# Patient Record
Sex: FEMALE | Race: OTHER | NOT HISPANIC OR LATINO | URBAN - METROPOLITAN AREA
[De-identification: names, ages, dates, MRNs, and addresses within clinical notes are randomized per-mention and may not be internally consistent; named-entity substitution may affect disease eponyms.]

---

## 2017-08-24 ENCOUNTER — IMPORTED ENCOUNTER (OUTPATIENT)
Dept: URBAN - METROPOLITAN AREA CLINIC 1 | Facility: CLINIC | Age: 42
End: 2017-08-24

## 2017-08-24 PROBLEM — B30.9: Noted: 2017-08-24

## 2017-08-24 PROCEDURE — 92002 INTRM OPH EXAM NEW PATIENT: CPT

## 2017-08-24 NOTE — PATIENT DISCUSSION
1.  Viral Conjunctivitis OU -- Advised no CTL wear. Cont Ofloxacin TID OU. Begin Pred Forte TID OU. Begin PF ATs Q1-2H OU. The condition was discussed with the patient. Cool compresses and artificial tears were recommended. The mechanism of transmission was explained and the patient was educated to wash hands and use separate towels and bedding. Return for an appointment in 1 week 10 (viral f/u) with Dr. Joni Schaumann.

## 2017-08-31 ENCOUNTER — IMPORTED ENCOUNTER (OUTPATIENT)
Dept: URBAN - METROPOLITAN AREA CLINIC 1 | Facility: CLINIC | Age: 42
End: 2017-08-31

## 2017-08-31 PROBLEM — B30.9: Noted: 2017-08-31

## 2017-08-31 PROCEDURE — 99213 OFFICE O/P EST LOW 20 MIN: CPT

## 2017-08-31 NOTE — PATIENT DISCUSSION
1.  Viral Conjunctivitis OU -- The condition was discussed with the patient. Cool compresses and PF artificial tears were recommended. The mechanism of transmission was explained and the patient was educated to wash hands and use separate towels and bedding. Patient can DC Ofloxacin and cont Pred Forte BID OU for a week then QD for a week then DC. **Patient can resume CL in a week   2. Return for an appointment in as needed with Dr. Katie Sofia.

## 2018-06-11 ENCOUNTER — IMPORTED ENCOUNTER (OUTPATIENT)
Dept: URBAN - METROPOLITAN AREA CLINIC 1 | Facility: CLINIC | Age: 43
End: 2018-06-11

## 2018-06-11 PROBLEM — H52.13: Noted: 2018-06-11

## 2018-06-11 PROCEDURE — S0621 ROUTINE OPHTHALMOLOGICAL EXA: HCPCS

## 2018-06-11 NOTE — PATIENT DISCUSSION
1. Myopia: Rx was given for correction if indicated and requested. 2. Dry Eyes w/ PEK OU - Recommend ATs TID-QID OU routinely Finalized CTL RXReturn for an appointment in 1 year 40/cc with Dr. Espinoza Pickard.

## 2018-06-11 NOTE — PATIENT DISCUSSION
Dry Eyes w/ PEK OU - Recommend ATs TID-QID OU routinely Finalized CTL RXReturn for an appointment in 1 year 40/cc with Dr. Som Rodríguez.

## 2018-08-29 ENCOUNTER — HOSPITAL ENCOUNTER (OUTPATIENT)
Dept: PHYSICAL THERAPY | Age: 43
Discharge: HOME | End: 2018-08-29
Payer: COMMERCIAL

## 2018-08-29 PROCEDURE — 97162 PT EVAL MOD COMPLEX 30 MIN: CPT

## 2018-08-29 PROCEDURE — 97140 MANUAL THERAPY 1/> REGIONS: CPT

## 2018-08-29 NOTE — PROGRESS NOTES
PHYSICAL THERAPY - DAILY TREATMENT NOTE Patient Name: Dennis Murry        Date: 2018 : 1975    Patient  Verified: Beth Parish Visit #:     Insurance: Payor: Ricardo Alejandro / Plan: [place] [employer]  CAPITATED PT / Product Type: Commerical / In time: 4:30 Out time: 5:45 Total Treatment Time: 75 Medicare Time Tracking (below) Total Timed Codes (min):  - 1:1 Treatment Time:  -  
 
TREATMENT AREA/ DIAGNOSIS = Pain in left lower leg [M79.662] SUBJECTIVE Pain Level (on 0 to 10 scale):  0  / 10 Medication Changes/New allergies or changes in medical history, any new surgeries or procedures? NO    If yes, update Summary List  
Subjective Functional Status/Changes:  []  No changes reported See Eval   
 
OBJECTIVE Modalities Rationale:     decrease pain to improve patient's ability to perform ADLs without pain 
 
 min [] Estim, type/location:    
                                 []  att     []  unatt     []  w/US     []  w/ice    []  w/heat  
 min []  Mechanical Traction: type/lbs   
               []  pro   []  sup   []  int   []  cont    []  before manual    []  after manual  
 min []  Ultrasound, settings/location:    
 min []  Iontophoresis w/ dexamethasone, location:   
                                           []  take home patch       []  in clinic  
10 min [x]  Ice     []  Heat    location/position:   
 min []  Vasopneumatic Device, press/temp:   
 min []  Other:   
[] Skin assessment post-treatment (if applicable):   
[]  intact    []  redness- no adverse reaction    
[]redness  adverse reaction:     
 
10  min Manual Therapy: STM to L gastroc/soleus/posterior tibialis Rationale:      increase tissue extensibility and decrease trigger points to improve patient's ability to perform ADLs without pain Throughout treatment session min Patient Education:  Yes    [x] Reviewed HEP []  Progressed/Changed HEP based on:      
 
Other Objective/Functional Measures: 
 
Se Eval  
 Post Treatment Pain Level (on 0 to 10) scale:   0  / 10 ASSESSMENT Assessment/Changes in Function:  
Pt to be fitted for orthotics to help with significant navicular drop 
  
[]  See Progress Note/Recertification Patient will continue to benefit from skilled PT services to modify and progress therapeutic interventions, address functional mobility deficits, address strength deficits, analyze and address soft tissue restrictions, analyze and cue movement patterns and analyze and modify body mechanics/ergonomics to attain remaining goals. Progress toward goals / Updated goals: 
See Eval  
 
PLAN [x]  Upgrade activities as tolerated YES Continue plan of care  
[]  Discharge due to :   
[]  Other:   
 
Therapist: Koki MckeonPT Date: 8/29/2018 Time: 6:22 PM  
 
  
Future Appointments Date Time Provider Elise Hall 9/5/2018 9:00  [address], PT REHAB [facility] AT [facility]  
9/6/2018 3:00  [address], PT [facility] AT [facility]  
9/10/2018 4:30 PM Iam Lu, PT REH[facility]ENTER AT [facility]  
9/12/2018 5:00 PM Rolanda Ip REH[facility] AT [facility]  
9/19/2018 5:00 PM Koki Ip REH[facility]  
9/24/2018 4:30 PM Sofy Hickman, PT [facility]  
9/26/2018 6:00 PM Rolanda Ip RE[facility]

## 2018-08-29 NOTE — PROGRESS NOTES
Vanessa Dowd [facility] [place] PHYSICAL THERAPY AT [address] [facility], [address] [place] [address], [address] [place] [address], [address] [place] [address] - Phone: [phone]  Fax: [phone] PLAN OF CARE / STATEMENT OF MEDICAL NECESSITY FOR PHYSICAL THERAPY SERVICES Patient Name: Shaylee Luna : 1975 Treatment Diagnosis: Left lower leg and ankle pain Medical Diagnosis: Pain in left lower leg [M79.662] Onset Date: 3 months ago Referral Source: Ressie Salts, * Start of Care [facility]): 2018 Prior Hospitalization: See medical history Provider #: [id number] Prior Level of Function: Pain free with all ADLs Comorbidities: Pt reports depression, asthma, and scoliosis Medications: Verified on Patient Summary List  
The Plan of Care and following information is based on the information from the initial evaluation.  
=========================================================================================== Assessment / key information:  Pt is a 43 yo female that presents to physical therapy with a chief complaint of pain in her calf and swelling in the lower leg. Pt history revealed that she torn her calf muscle in 2017 when she was running backwards on the sand at [employer]. Pt underwent PT for the tear and states that it got better for a while. Pt reports that after she stopped doing stuff for her ankle about three months later she began to get swelling in her leg when she would sit for extended periods of time and would have occasional ankle pain. She presents with pain ranging from 0-10/10, located on the medial aspect of the calf and sometimes in the lateral ankle. X-rays were negative for fractures and ultrasound was negative for DVT. Pain is made worse with sitting and standing for prolonged periods, better with elevating the leg. Palpation reveals TTP to L tibialis posterior. Gait: Pt walks with increased pronation of the L ankle.   Ankle AROM/PROM: DF WNL, PF WNL, Inv WNL,  EV WNL,LE MMT: hip flex 4+/5, abd 4/5, add 5/5, ext 5/5, knee flex 5/5, ext 5/5/, ankle DF 5/5, PF 5/5, Inv4/5, Ever 4/5. Sensation is intact to light touch in the LE. . (-) Special tests include: Leon test and pascual sign. Edema is as follows: Malleoli R: 23.25cm L: 23.5cm, Mid Calf 5in inferior to patella: R: 39.5cm  L: 40.5. Navicular drop R: 1.4cm L:2.2cm  FOTO Score: 73.  Pt will benefit from PT interventions to address the aforementioned deficits and allow pt to return to PLOF. Eval Complexity: History MEDIUM  Complexity : 1-2 comorbidities / personal factors will impact the outcome/ POC ;  Examination  MEDIUM Complexity : 3 Standardized tests and measures addressing body structure, function, activity limitation and / or participation in recreation ; Presentation MEDIUM Complexity : Evolving with changing characteristics ; Decision Making MEDIUM Complexity : FOTO score of 26-74; Overall Complexity MEDIUM 
=========================================================================================== Problem List: pain affecting function, decrease strength, edema affecting function, impaired gait/ balance, decrease ADL/ functional abilitiies and decrease activity tolerance Treatment Plan may include any combination of the following: Therapeutic exercise, Therapeutic activities, Neuromuscular re-education, Physical agent/modality, Gait/balance training, Manual therapy, Patient education and Self Care training Patient / Family readiness to learn indicated by: asking questions, trying to perform skills and interestPersons(s) to be included in education: patient (P) Barriers to Learning/Limitations: no 
Measures taken:   
Patient Goal (s): Pt wants to get stronger and decrease pain and swelling Patient self reported health status: good Rehabilitation Potential: good ? Short Term Goals: To be accomplished in  2  weeks: 1.  Pt will be independent and compliant with HEP to decrease pain, increase ROM and return pt to PLOF. 2.Pt will note a +3 on the GROC to show improvements ? Long Term Goals: To be accomplished in  6  weeks: 
1. Increase score on FOTO by > or = to 90 points to demo an increase in functional activity tolerance with the LE. 2. Pt will note < or = 1/10 pain with all mobility to improve comfort with ADLs. 3. Pt will have LE strength of 5/5 to improve her ability to perform ADLs 4. Pt will note a +6 score on the GROC to show improvement Frequency / Duration:   Patient to be seen  2  times per week for 6  weeks: 
Patient / Caregiver education and instruction: self care, activity modification and exercises G-Codes (GP): - Therapist Signature: Omid Calabrese PT Date: 8/29/2018 Certification Period: - Time: 6:06 PM  
=========================================================================================== I certify that the above Physical Therapy Services are being furnished while the patient is under my care. I agree with the treatment plan and certify that this therapy is necessary. Physician Signature:       Date:      Time:  Please sign and return to [facility] or you may fax the signed copy to [phone]. Thank you.

## 2018-09-05 ENCOUNTER — HOSPITAL ENCOUNTER (OUTPATIENT)
Dept: PHYSICAL THERAPY | Age: 43
Discharge: HOME | End: 2018-09-05
Payer: COMMERCIAL

## 2018-09-05 PROCEDURE — 97140 MANUAL THERAPY 1/> REGIONS: CPT

## 2018-09-05 PROCEDURE — 97110 THERAPEUTIC EXERCISES: CPT

## 2018-09-05 NOTE — PROGRESS NOTES
PHYSICAL THERAPY - DAILY TREATMENT NOTE Patient Name: Gerardo Perdomo        Date: 2018 : 1975   YES Patient  Verified Visit #:      12  Insurance: Payor: Cyndie Dela Cruz / Plan: [employer]  CAPITATED PT / Product Type: Commerical / In time: 900 Out time: 1005 Total Treatment Time: 72 Medicare Time Tracking (below) Total Timed Codes (min):   1:1 Treatment Time:    
 
TREATMENT AREA =  Pain in left lower leg [M79.662] SUBJECTIVE Pain Level (on 0 to 10 scale):  0  / 10 Medication Changes/New allergies or changes in medical history, any new surgeries or procedures? NO    If yes, update Summary List  
Subjective Functional Status/Changes:  []  No changes reported \"I had no swelling for a few days but then yesterday it swelled after going to work. \" OBJECTIVE Modalities Rationale:     decrease inflammation and decrease pain to improve patient's ability to tolerate ADL's 
 min [] Estim, type/location:    
                                 []  att     []  unatt     []  w/US     []  w/ice    []  w/heat  
 min []  Mechanical Traction: type/lbs   
               []  pro   []  sup   []  int   []  cont    []  before manual    []  after manual  
 min []  Ultrasound, settings/location:    
 min []  Iontophoresis w/ dexamethasone, location:   
                                           []  take home patch       []  in clinic  
10 min [x]  Ice     []  Heat    location/position: Supine to Left LE  
 min []  Vasopneumatic Device, press/temp:   
 min []  Other:   
[x] Skin assessment post-treatment (if applicable):   
[x]  intact    [x]  redness- no adverse reaction    
[]redness  adverse reaction:     
 
30 min Therapeutic Exercise:  [x]  See flow sheet Rationale:      increase ROM and increase strength to improve the patients ability to perform ADL's  
 
25 min Manual Therapy: TrPR to left post tib, STM Rationale:      decrease pain and increase tissue extensibility to improve patient's ability to perform ADL's 
 
 
 min Patient Education:  YES  Reviewed HEP []  Progressed/Changed HEP based on: Other Objective/Functional Measures: 
 
Progressed therex per flow sheet Post Treatment Pain Level (on 0 to 10) scale:   0  / 10 ASSESSMENT Assessment/Changes in Function:  
 
Difficulty with BAPS and LE control. Continued soft tissue restrictions in left post tib 
  
[]  See Progress Note/Recertification Patient will continue to benefit from skilled PT services to modify and progress therapeutic interventions, address functional mobility deficits, address ROM deficits, address strength deficits, analyze and address soft tissue restrictions, analyze and cue movement patterns, analyze and modify body mechanics/ergonomics and assess and modify postural abnormalities to attain remaining goals. Progress toward goals / Updated goals: 
 
Patient is progressing towards newly established goals PLAN [x]  Upgrade activities as tolerated YES Continue plan of care  
[]  Discharge due to :   
[]  Other:   
 
Therapist: Becky Costa PT Date: 9/5/2018 Time: 8:21 AM  
 
Future Appointments Date Time Provider Elise Hall 9/5/2018 9:00 AM Becky Costa PT REHAB CENTER AT [facility]  
9/6/2018 3:00  [address], PT REHAB CENTER AT [facility]  
9/10/2018 4:30 PM Iam Lu PT REHAB CENTER AT [facility]  
9/12/2018 5:00 PM Caleb Zamora REH[facility]ENTER AT [facility]  
9/19/2018 5:00 PM Caleb Zamora RE[facility] AT [facility]  
9/24/2018 4:30 PM Sofy Lott PT REHAB CENTER AT [facility]  
9/26/2018 6:00 PM Caleb Zamora REH[facility] AT [facility]

## 2018-09-06 ENCOUNTER — HOSPITAL ENCOUNTER (OUTPATIENT)
Dept: PHYSICAL THERAPY | Age: 43
Discharge: HOME | End: 2018-09-06
Payer: COMMERCIAL

## 2018-09-06 PROCEDURE — 97763 ORTHC/PROSTC MGMT SBSQ ENC: CPT

## 2018-09-06 NOTE — PROGRESS NOTES
PHYSICAL THERAPY - DAILY TREATMENT NOTE Patient Name: Jeanna Plasencia        Date: 2018 : 1975   YES Patient  Verified Visit #:   3  of   12  Insurance: Payor: Antony Layton / Plan: [place] OPTIM  CAPITATED PT / Product Type: Commerical / In time: 900 Out time: 1005 Total Treatment Time: 72 Medicare Time Tracking (below) Total Timed Codes (min):   1:1 Treatment Time:    
TREATMENT AREA =  Pain in left lower leg [M79.662] SUBJECTIVE Pain Level (on 0 to 10 scale):  0  / 10 Medication Changes/New allergies or changes in medical history, any new surgeries or procedures? NO    If yes, update Summary List  
Subjective Functional Status/Changes:  []  No changes reported Using Birkenstocks, sandals with straps for summer and sneakers for support. Shoe size 8.5. Over the counter  not helpful. OBJECTIVE 45 min Orthotic Fitting:  [x]  See flow sheet Rationale:      increase ROM and increase strength to improve the patients ability to perform ADL's  
 min Patient Education:  Mary Ahumada []  Progressed/Changed HEP based on: Other Objective/Functional Measures: 
 
 
Ambulation shows moderatel medial long arch collapse with gait, and mod cacl EV. No forefoot abduction with stance noted. LE strength is reduced for right hip abd, markedly reduced left hip abd. Midfoot and first ray mobility hypermobile. Subtalar neutral assessment: varus lower leg bilat; varus forefoot to rearfoot bilat L>R. Post Treatment Pain Level (on 0 to 10) scale:   0  / 10 ASSESSMENT Assessment/Changes in Function: Pt with improved symptoms with [employer] orthotics B, L > R posting for RF and FF posting. []  See Progress Note/Recertification Patient will continue to benefit from skilled PT services to modify and progress therapeutic interventions, address functional mobility deficits, address ROM deficits, address strength deficits, analyze and address soft tissue restrictions, analyze and cue movement patterns, analyze and modify body mechanics/ergonomics and assess and modify postural abnormalities to attain remaining goals. Progress toward goals / Updated goals: 
 
1. Independent with HEP. 2. Will f/u for orthotic pickup with good initial fit and will return for adjustments prn.  
3. Will acquire stability shoes to assist with pronation/IR of LE with stance. PLAN [x]  Upgrade activities as tolerated YES Continue plan of care  
[]  Discharge due to :   
[]  Other:   
 
Therapist: Ok Vargas PT Date: 9/6/2018 Time: 3:15 pm  
 
Future Appointments Date Time Provider Elise Hall 9/10/2018 4:30 PM Ok Vargas PT REHAB CENTER AT [facility]  
9/12/2018 5:00 PM [facility] AT [facility]  
9/19/2018 5:00 PM [facility]  
9/21/2018 3:00 PM [facility]  
9/24/2018 4:30 PM Sofy Esparza, [facility]  
9/26/2018 6:00 PM [facility] AT [facility]

## 2018-09-10 ENCOUNTER — HOSPITAL ENCOUNTER (OUTPATIENT)
Dept: PHYSICAL THERAPY | Age: 43
Discharge: HOME | End: 2018-09-10
Payer: COMMERCIAL

## 2018-09-10 PROCEDURE — 97140 MANUAL THERAPY 1/> REGIONS: CPT

## 2018-09-10 PROCEDURE — 97110 THERAPEUTIC EXERCISES: CPT

## 2018-09-10 NOTE — PROGRESS NOTES
PHYSICAL THERAPY - DAILY TREATMENT NOTE Patient Name: iMna Wagner        Date: 9/10/2018 : 1975   YES Patient  Verified Visit #:      12  Insurance: Payor: Madiha Erickson / Plan: [employer]  CAPITATED PT / Product Type: Commerical / In time: 435 Out time: 515 Total Treatment Time: 40 Medicare Time Tracking (below) Total Timed Codes (min):   1:1 Treatment Time:    
 
TREATMENT AREA = Pain in left lower leg [M79.662] SUBJECTIVE Pain Level (on 0 to 10 scale):  0  / 10 Medication Changes/New allergies or changes in medical history, any new surgeries or procedures? NO    If yes, update Summary List  
Subjective Functional Status/Changes:  []  No changes reported Some R knee pain yesterday after standing a period of time--maybe due to new orthotics. Will  monitor OBJECTIVE 30 min Therapeutic Exercise:  [x]  See flow sheet Rationale:      increase ROM and increase strength to improve the patients ability to amb/ stand / sit prolonged with less pain 10 min Manual Therapy: Technique:     
[x] STM[]IASTM[x]TPR[]PROM[] Stretching 
[] SOR[] man traction[] []OP with REIL 
[]Jt manipulation []Gr I [] II []  III [] IV[] V[] Treatment Area:  L post/ med calf region Rationale:      decrease pain, increase ROM, increase tissue extensibility and decrease trigger points to improve patient's ability to amb/ stand / sit prolonged period 
 
 
 
 
 
throughout Rx min Patient Education:  Mary Reardon []  Progressed/Changed HEP based on: Other Objective/Functional Measures: 
 
Mild edema popliteal region L Post Treatment Pain Level (on 0 to 10) scale:   0  / 10 ASSESSMENT Assessment/Changes in Function:  
 
Functional improvement:  Reports wearing orthotics with benefit Functional limitation:  Mild edema  
  
[]  See Progress Note/Recertification Patient will continue to benefit from skilled PT services to modify and progress therapeutic interventions, address functional mobility deficits, address ROM deficits, address strength deficits, analyze and address soft tissue restrictions and analyze and cue movement patterns to attain remaining goals. Progress toward goals / Updated goals: 
 
Progressed HEP  
 
PLAN [x]  Upgrade activities as tolerated YES Continue plan of care  
[]  Discharge due to :   
[]  Other:   
 
Therapist: Feliciano Gifford PT Date: 9/10/2018 Time: 4:38 PM  
 
Future Appointments Date Time Provider Elise Hall 9/12/2018 5:00 PM [facility]  
9/19/2018 5:00 PM [facility]  
9/21/2018 3:00 PM [facility]  
9/24/2018 4:30 PM Sofy Do, [facility]  
9/26/2018 6:00 PM [facility]  
10/16/2018 9:30 AM MD Marcos Boothe

## 2018-09-12 ENCOUNTER — APPOINTMENT (OUTPATIENT)
Dept: PHYSICAL THERAPY | Age: 43
End: 2018-09-12
Payer: COMMERCIAL

## 2018-09-19 ENCOUNTER — HOSPITAL ENCOUNTER (OUTPATIENT)
Dept: PHYSICAL THERAPY | Age: 43
Discharge: HOME | End: 2018-09-19
Payer: COMMERCIAL

## 2018-09-19 PROCEDURE — 97110 THERAPEUTIC EXERCISES: CPT

## 2018-09-19 PROCEDURE — 97140 MANUAL THERAPY 1/> REGIONS: CPT

## 2018-09-19 NOTE — PROGRESS NOTES
PHYSICAL THERAPY - DAILY TREATMENT NOTE Patient Name: Mark Faith        Date: 2018 : 1975    Patient  Verified: Vince Reardon Visit #:     Insurance: Payor: Neftaly Faith / Plan: [facility]  CAPITATED PT / Product Type: Commerical / In time: 5:00 Out time: 6:10 Total Treatment Time: 70 Medicare Time Tracking (below) Total Timed Codes (min):  - 1:1 Treatment Time:  -  
 
TREATMENT AREA/ DIAGNOSIS = Pain in left lower leg [M79.662] SUBJECTIVE Pain Level (on 0 to 10 scale):  0  / 10 Medication Changes/New allergies or changes in medical history, any new surgeries or procedures? NO    If yes, update Summary List  
Subjective Functional Status/Changes:  []  No changes reported Pt said her feet have been sore but she has been wearing the orthotic continuously for 8 hours at work since she got them. OBJECTIVE Modalities Rationale:     decrease pain to improve patient's ability to perform ADLs without pain 
 
 min [] Estim, type/location:    
                                 []  att     []  unatt     []  w/US     []  w/ice    []  w/heat  
 min []  Mechanical Traction: type/lbs   
               []  pro   []  sup   []  int   []  cont    []  before manual    []  after manual  
 min []  Ultrasound, settings/location:    
 min []  Iontophoresis w/ dexamethasone, location:   
                                           []  take home patch       []  in clinic  
10 min [x]  Ice     []  Heat    location/position: Medial calf  
 min []  Vasopneumatic Device, press/temp:   
 min []  Other:   
[] Skin assessment post-treatment (if applicable):   
[]  intact    []  redness- no adverse reaction    
[]redness  adverse reaction:     
 
15 min Manual Therapy: STM to L gastroc/soleus/ tib posterior Rationale:      increase ROM, increase tissue extensibility and decrease trigger points to improve patient's ability to perform ADLs without pain 45 min Therapeutic Exercise:  [x]  See flow sheet Rationale:      increase strength, improve coordination and improve balance to improve the patients ability to perform ADLs without pain Throughout treatment session min Patient Education:  Yes    [x] Reviewed HEP []  Progressed/Changed HEP based on: Other Objective/Functional Measures: 
 
Pt had no increase in pain during treatment session Pt had increase TTP on the tibialis posterior of the left leg Initiated new closed kinetic chain exercises (see flowsheet) Post Treatment Pain Level (on 0 to 10) scale:   0  / 10 ASSESSMENT Assessment/Changes in Function: Pt benefited from manual therapy based on improved TTP post treatment. []  See Progress Note/Recertification Patient will continue to benefit from skilled PT services to modify and progress therapeutic interventions, address functional mobility deficits, address ROM deficits, address strength deficits, analyze and address soft tissue restrictions and analyze and cue movement patterns to attain remaining goals. Progress toward goals / Updated goals: 
 
Continue with current treatment plan PLAN [x]  Upgrade activities as tolerated YES Continue plan of care  
[]  Discharge due to :   
[]  Other:   
 
Therapist: Melyssa CruzPT Date: 9/19/2018 Time: 5:01 PM  
 
  
Future Appointments Date Time Provider Elise Hall 9/21/2018 3:00 PM Melyssa Cruz REH[facility]ENTER AT [facility]  
9/24/2018 4:30 PM Sofy Akhtar, PT REHAB CENTER AT [facility]  
9/26/2018 6:00 PM Melyssa Cruz REH[facility]ENTER AT [facility]  
10/16/2018 9:30 AM MD Marcos Cabello

## 2018-09-21 ENCOUNTER — HOSPITAL ENCOUNTER (OUTPATIENT)
Dept: PHYSICAL THERAPY | Age: 43
End: 2018-09-21
Payer: COMMERCIAL

## 2018-09-24 ENCOUNTER — APPOINTMENT (OUTPATIENT)
Dept: PHYSICAL THERAPY | Age: 43
End: 2018-09-24
Payer: COMMERCIAL

## 2018-09-26 ENCOUNTER — APPOINTMENT (OUTPATIENT)
Dept: PHYSICAL THERAPY | Age: 43
End: 2018-09-26
Payer: COMMERCIAL

## 2018-10-01 ENCOUNTER — APPOINTMENT (OUTPATIENT)
Dept: PHYSICAL THERAPY | Age: 43
End: 2018-10-01
Payer: COMMERCIAL

## 2018-10-03 ENCOUNTER — HOSPITAL ENCOUNTER (OUTPATIENT)
Dept: PHYSICAL THERAPY | Age: 43
Discharge: HOME | End: 2018-10-03
Payer: COMMERCIAL

## 2018-10-03 PROCEDURE — 97140 MANUAL THERAPY 1/> REGIONS: CPT

## 2018-10-03 PROCEDURE — 97110 THERAPEUTIC EXERCISES: CPT

## 2018-10-03 NOTE — PROGRESS NOTES
PHYSICAL THERAPY - DAILY TREATMENT NOTE Patient Name: Michael Zee        Date: 10/3/2018 : 1975    Patient  Verified: Hector Durand Visit #:     Insurance: Payor: Jeanine Souza / Plan: [facility]  CAPITATED PT / Product Type: Commerical / In time: 5:35 Out time: 6:35 Total Treatment Time: 60 Medicare Time Tracking (below) Total Timed Codes (min):  - 1:1 Treatment Time:  -  
TREATMENT AREA/ DIAGNOSIS = Pain in left lower leg [M79.662] SUBJECTIVE Pain Level (on 0 to 10 scale):  1  / 10 Medication Changes/New allergies or changes in medical history, any new surgeries or procedures? NO    If yes, update Summary List  
Subjective Functional Status/Changes:  []  No changes reported Pt reports that she is  on the left calf. Pt reports its a little sore today from walking around in flip flops yesterday [id number] min Manual Therapy: STM to L gastroc Rationale:      increase tissue extensibility and decrease trigger points to improve patient's ability to perform ADLs without pain 50 min Therapeutic Exercise:  [x]  See flow sheet Rationale:      increase strength, improve coordination and improve balance to improve the patients ability to perform ADLs without pain Throughout treatment session min Patient Education:  Yes    [x] Reviewed HEP []  Progressed/Changed HEP based on: Other Objective/Functional Measures: 
 
Pt had no increase in pain during treatment session Post Treatment Pain Level (on 0 to 10) scale:   0  / 10 ASSESSMENT Assessment/Changes in Function:  
 
Pt had decreased TTP post treatment. Pt advised to perform HEP more often to help her progress faster. []  See Progress Note/Recertification Patient will continue to benefit from skilled PT services to modify and progress therapeutic interventions, address functional mobility deficits, address ROM deficits, address strength deficits, analyze and address soft tissue restrictions and analyze and cue movement patterns to attain remaining goals. Progress toward goals / Updated goals: 
Pt improving with all weightbearing activities. PLAN [x]  Upgrade activities as tolerated YES Continue plan of care  
[]  Discharge due to :   
[]  Other:   
 
Therapist: Katherine Corbett ,PT Date: 10/3/2018 Time: 5:46 PM  
 
  
Future Appointments Date Time Provider Elise Hall 10/11/2018 4:30 PM Sofy Sutton, PT [facility]  
10/16/2018 9:30 AM MD Marcos Ortiz

## 2018-10-11 ENCOUNTER — HOSPITAL ENCOUNTER (OUTPATIENT)
Dept: PHYSICAL THERAPY | Age: 43
Discharge: HOME | End: 2018-10-11
Payer: COMMERCIAL

## 2018-10-11 PROCEDURE — 97140 MANUAL THERAPY 1/> REGIONS: CPT

## 2018-10-11 PROCEDURE — 97110 THERAPEUTIC EXERCISES: CPT

## 2018-10-11 NOTE — PROGRESS NOTES
PHYSICAL THERAPY - DAILY TREATMENT NOTE Patient Name: Marika Ye        Date: 10/11/2018 : 1975    Patient  Verified: Harsha Gay Visit #:     Insurance: Payor: Sharmaine Fee / Plan: [facility] [employer]  CAPITATED PT / Product Type: Commerical / In time: 445 Out time: 515 Total Treatment Time: 30 Medicare Time Tracking (below) Total Timed Codes (min):  - 1:1 Treatment Time:  -  
TREATMENT AREA/ DIAGNOSIS = Pain in left lower leg [M79.662] SUBJECTIVE Pain Level (on 0 to 10 scale):  1  / 10 Medication Changes/New allergies or changes in medical history, any new surgeries or procedures? NO    If yes, update Summary List  
Subjective Functional Status/Changes:  []  No changes reported Exercises are really helping, not getting as much cramping. Pt reports that she is trying to wear orthotic and \"good\" leg is feeling bad. [id number] min Manual Therapy: STM to L gastroc Rationale:      increase tissue extensibility and decrease trigger points to improve patient's ability to perform ADLs without pain 20 min Therapeutic Exercise:  [x]  See flow sheet Rationale:      increase strength, improve coordination and improve balance to improve the patients ability to perform ADLs without pain T/o treatment session min Patient Education:  Yes    [x] Reviewed HEP []  Progressed/Changed HEP based on: Other Objective/Functional Measures: 
 
Pt orthotic adjusted for R sided midfoot support. Review of HEP after manual tx. Post Treatment Pain Level (on 0 to 10) scale:   0  / 10 ASSESSMENT Assessment/Changes in Function: Pt with I with current HEP. []  See Progress Note/Recertification Patient will continue to benefit from skilled PT services to modify and progress therapeutic interventions, address functional mobility deficits, address ROM deficits, address strength deficits, analyze and address soft tissue restrictions and analyze and cue movement patterns to attain remaining goals. Progress toward goals / Updated goals: Pt with good progress to goals--reassess at next visit. PLAN [x]  Upgrade activities as tolerated YES Continue plan of care  
[]  Discharge due to :   
[]  Other:   
 
Therapist: Rob Aragon PT ,PT Date: 10/11/2018 Time: 5:02 PM  
 
  
Future Appointments Date Time Provider Elise Hall 10/16/2018 9:30 AM Shirley Traore MD [facility]

## 2018-10-18 ENCOUNTER — HOSPITAL ENCOUNTER (OUTPATIENT)
Dept: PHYSICAL THERAPY | Age: 43
Discharge: HOME | End: 2018-10-18
Payer: COMMERCIAL

## 2018-10-18 PROCEDURE — 97110 THERAPEUTIC EXERCISES: CPT

## 2018-10-18 NOTE — PROGRESS NOTES
PHYSICAL THERAPY - DAILY TREATMENT NOTE Patient Name: Love Sierra        Date: 10/18/2018 : 1975    Patient  Verified: Kiana Honeycutt Visit #:     Insurance: Payor: Ladd Crigler / Plan: [facility]  CAPITATED PT / Product Type: Commerical / In time: 415 Out time: 5 Total Treatment Time: 45 Medicare Time Tracking (below) Total Timed Codes (min):  - 1:1 Treatment Time:  -  
TREATMENT AREA/ DIAGNOSIS = Pain in left lower leg [M79.662] SUBJECTIVE Pain Level (on 0 to 10 scale):  1  / 10 Medication Changes/New allergies or changes in medical history, any new surgeries or procedures? NO    If yes, update Summary List  
Subjective Functional Status/Changes:  []  No changes reported Doing much better. Less pain from B LE, knee and ankle. Noting good response to orthotic. OBJECTIVE 5 min Manual Therapy: STM to L gastroc Rationale:      increase tissue extensibility and decrease trigger points to improve patient's ability to perform ADLs without pain 4035 min Therapeutic Exercise:  [x]  See flow sheet Rationale:      increase strength, improve coordination and improve balance to improve the patients ability to perform ADLs without pain T/o treatment session min Patient Education:  Yes    [x] Reviewed HEP []  Progressed/Changed HEP based on: Other Objective/Functional Measures: 
 
Pt updated HEP issued. Post Treatment Pain Level (on 0 to 10) scale:   0  / 10 ASSESSMENT Assessment/Changes in Function:  
See PN. 
  
[]  See Progress Note/Recertification Patient will continue to benefit from skilled PT services to modify and progress therapeutic interventions, address functional mobility deficits, address ROM deficits, address strength deficits, analyze and address soft tissue restrictions and analyze and cue movement patterns to attain remaining goals. See PN. PLAN [x]  Upgrade activities as tolerated YES Continue plan of care  
[]  Discharge due to :   
 []  Other:   
 
Therapist: Alfred Botello PT ,PT Date: 10/18/2018 Time: 5:12 PM  
 
  
No future appointments.

## 2018-11-01 ENCOUNTER — APPOINTMENT (OUTPATIENT)
Dept: PHYSICAL THERAPY | Age: 43
End: 2018-11-01
Payer: COMMERCIAL

## 2018-11-08 ENCOUNTER — HOSPITAL ENCOUNTER (OUTPATIENT)
Dept: PHYSICAL THERAPY | Age: 43
Discharge: HOME | End: 2018-11-08
Payer: COMMERCIAL

## 2018-11-08 PROCEDURE — 97110 THERAPEUTIC EXERCISES: CPT

## 2018-11-09 NOTE — PROGRESS NOTES
PHYSICAL THERAPY - DAILY TREATMENT NOTE Patient Name: Naldo Wylie        Date: 2018 : 1975    Patient  Verified: Precious Muñoz Visit #:   [facility]: [facility] / [facility]: [facility] [employer]  CAPITATED PT / Product Type: Commerical / In time: 415 Out time: 5 Total Treatment Time: 45 Medicare Time Tracking (below) Total Timed Codes (min):  - 1:1 Treatment Time:  -  
TREATMENT AREA/ DIAGNOSIS = Pain in joint, lower leg [M25.569] SUBJECTIVE Pain Level (on 0 to 10 scale):  1  / 10 Medication Changes/New allergies or changes in medical history, any new surgeries or procedures? NO    If yes, update Summary List  
Subjective Functional Status/Changes:  []  No changes reported Doing much better with orthotic and has been doing her HEP. She notes significant improvement in response to orthotic. OBJECTIVE5 min Manual Therapy: STM to L gastroc Rationale:      increase tissue extensibility and decrease trigger points to improve patient's ability to perform ADLs without pain 40 min Therapeutic Exercise:  [x]  See flow sheet Rationale:      increase strength, improve coordination and improve balance to improve the patients ability to perform ADLs without pain T/o treatment session min Patient Education:  Yes    [x] Reviewed HEP []  Progressed/Changed HEP based on: Other Objective/Functional Measures: 
 
Pt updated HEP discussed and formalized with pt D/C prep completed. Post Treatment Pain Level (on 0 to 10) scale:   0  / 10 ASSESSMENT Assessment/Changes in Function:  
See D/C. []  See Progress Note/Recertification Patient will continue to benefit from skilled PT services to modify and progress therapeutic interventions, address functional mobility deficits, address ROM deficits, address strength deficits, analyze and address soft tissue restrictions and analyze and cue movement patterns to attain remaining goals. All goals met, see D/C. PLAN 
 [x]  Upgrade activities as tolerated YES Continue plan of care  
[]  Discharge due to :   
[]  Other:   
 
Therapist: Marin Walsh PT ,PT Date: 11/9/2018 Time: 5:18 PM  
 
  
No future appointments.

## 2018-11-09 NOTE — PROGRESS NOTES
Vanessa Aldridgekikayden Dowd [address] [facility] PHYSICAL THERAPY AT [address] [facility], [address] [place] [address], [address] [place] [address], [address] [place] [address]  Phone: [phone]  Fax: [phone] DISCHARGE SUMMARY Patient Name: Editha Oppenheim : 1975 Treatment/Medical Diagnosis: Pain in joint, lower leg [M25.569] Referral Source: Conner Moyer, * Date of Initial Visit: 10/18/18 Attended Visits: 8 Missed Visits: 0  
 
SUMMARY OF TREATMENT Editha Oppenheim has been seen at our clinic 2-3x/wk for a total of 8 visits. Pt treatment has consisted of aerobic warm-up with stationary bike, therapeutic exercise for ankle ROM, ankle stability, and manual therapy. CURRENT STATUS Pt has had a good response to physical therapy treatment. She reports improved ability to ambulate with less pain and good response to updated orthotic. She is noting improvements in ROM provided she performs HEP for stretching and strengthening. Previous Goals: 
1. Increase score on FOTO by > or = to 90 points to demo an increase in functional activity tolerance with the LE. 2. Pt will note < or = 1/10 pain with all mobility to improve comfort with ADLs. 3. Pt will have LE strength of 5/5 to improve her ability to perform ADLs 4. Pt will note a +6 score on the GROC to show improvement Prior Level/Current Level: 
1) Prior Level: 73 
 Current Level: compliant Goal Met? 84 
2) Prior Level: 5 Current Level: 1 Goal Met? Yes 
3) Prior Level: 4/5 Current Level: 5/5 Goal Met? yes 4) Prior Level: +3 
 Current Level: +6 
 Goal Met? yes RECOMMENDATIONS Pt has made good progress toward LTGs and will be D/C to HEP at this time. If you have any questions/comments please contact us directly at [phone]. Thank you for allowing us to assist in the care of your patient. Therapist Signature: Tiffanie Theodore PT Date: 2018 Reporting Period: 
 -18 Time: 5:13 PM

## 2019-01-28 ENCOUNTER — IMPORTED ENCOUNTER (OUTPATIENT)
Dept: URBAN - METROPOLITAN AREA CLINIC 1 | Facility: CLINIC | Age: 44
End: 2019-01-28

## 2019-02-19 ENCOUNTER — IMPORTED ENCOUNTER (OUTPATIENT)
Dept: URBAN - METROPOLITAN AREA CLINIC 1 | Facility: CLINIC | Age: 44
End: 2019-02-19

## 2019-02-19 PROBLEM — H43.813: Noted: 2019-02-19

## 2019-02-19 PROBLEM — H04.123: Noted: 2019-02-19

## 2019-02-19 PROBLEM — H16.143: Noted: 2019-02-19

## 2019-02-19 PROCEDURE — 92015 DETERMINE REFRACTIVE STATE: CPT

## 2019-02-19 PROCEDURE — 92014 COMPRE OPH EXAM EST PT 1/>: CPT

## 2019-02-19 NOTE — PATIENT DISCUSSION
1. HENRI w/ PEK OU- Use ATs TID OU Routinely. 2. H/o CL Wear - Could try either computer rx or using OTC Readers over top of her CLs PRN (Can use 0.500.75 or 1.00 readers) All conditions discussed with patient patient understood. Return for an appointment in 1 year 27 with Dr. Charlotte Andres.

## 2020-01-13 ENCOUNTER — IMPORTED ENCOUNTER (OUTPATIENT)
Dept: URBAN - METROPOLITAN AREA CLINIC 1 | Facility: CLINIC | Age: 45
End: 2020-01-13

## 2020-01-13 PROBLEM — H44.23: Noted: 2020-01-13

## 2020-01-13 PROBLEM — H52.4: Noted: 2020-01-13

## 2020-01-13 PROCEDURE — 92015 DETERMINE REFRACTIVE STATE: CPT

## 2020-01-13 PROCEDURE — 92014 COMPRE OPH EXAM EST PT 1/>: CPT

## 2020-01-13 NOTE — PATIENT DISCUSSION
1. Myopia: Rx was given for correction if indicated and requested. 2. Presbyopia3. HENRI w/ PEK OU- ATs TID OU Routinely. CTL trials given; Discussed with patient best corrected vision will be achieved with glasses. Discussed with patient improving distance visual acuity may negatively affect near visual acuity and vice versa. Patient understands may need to wear OTC readers to read fine print. Return for an appointment in 1 week cc with Dr. Marcy Fong.

## 2020-01-17 ENCOUNTER — IMPORTED ENCOUNTER (OUTPATIENT)
Dept: URBAN - METROPOLITAN AREA CLINIC 1 | Facility: CLINIC | Age: 45
End: 2020-01-17

## 2020-01-17 NOTE — PATIENT DISCUSSION
CC today. Good comfort fit. Patient c/o of dva. Changes made OU and new CTL trials given. Return for an appointment in 1 week for a CC with Dr. Fabio Nelson.

## 2020-01-22 ENCOUNTER — IMPORTED ENCOUNTER (OUTPATIENT)
Dept: URBAN - METROPOLITAN AREA CLINIC 1 | Facility: CLINIC | Age: 45
End: 2020-01-22

## 2020-01-22 NOTE — PATIENT DISCUSSION
CC today- Good comfort. Good nva. Patient c/o of dva. Changes made OU and new CTL trials ordered. Ok to final CTL if these improve her dva. Return for an appointment in 1 year 40/cc with Dr. Fabio Nelson.

## 2020-02-12 ENCOUNTER — IMPORTED ENCOUNTER (OUTPATIENT)
Dept: URBAN - METROPOLITAN AREA CLINIC 1 | Facility: CLINIC | Age: 45
End: 2020-02-12

## 2020-02-12 NOTE — PATIENT DISCUSSION
CC today. Good comfort fit and vision. Finalized CTL Rx today. Return for an appointment in 1 year for a 40/cc with Dr. Milan Madrigal.

## 2021-02-26 NOTE — PATIENT DISCUSSION
Discussed option of iStent implantation at the time of cataract surgery with the goal of lower IOP or fewer medications. Indications, risks, benefits and alternatives to iStent implantation discussed with patient. Handout given. Questions answered. Patient elects to proceed.

## 2021-02-26 NOTE — PATIENT DISCUSSION
PLAN: CE/IOL OS THEN OD W/I-STENT OU, goal jr ou, p.o with O.D in [address], no van needed. Wants Imprimis ou. Candidate for all lens options. Pt wants BASIC only ou, understands she will most likely need glasses for all ranges.

## 2021-03-16 NOTE — PATIENT DISCUSSION
PLAN: CE/IOL OS THEN OD W/I-STENT OU, goal jr ou, p.o with O.D in [facility], no van needed. Wants Imprimis ou. Candidate for all lens options. Pt wants BASIC only ou, understands she will most likely need glasses for all ranges.

## 2021-03-23 NOTE — PATIENT DISCUSSION
PLAN: CE/IOL OS THEN OD W/I-STENT OU, goal jr ou, p.o with O.D in [employer], no van needed. Wants Imprimis ou. Candidate for all lens options. Pt wants BASIC only ou, understands she will most likely need glasses for all ranges.

## 2021-03-23 NOTE — PATIENT DISCUSSION
Cataract surgery has been performed in the first eye and activities of daily living are still impaired. The patient would like to proceed with cataract surgery in the second eye as scheduled. The patient elects basic surgery OD, goal of emmetropia.

## 2021-05-25 ENCOUNTER — IMPORTED ENCOUNTER (OUTPATIENT)
Dept: URBAN - METROPOLITAN AREA CLINIC 1 | Facility: CLINIC | Age: 46
End: 2021-05-25

## 2021-05-25 PROBLEM — H52.13: Noted: 2021-05-25

## 2021-05-25 PROBLEM — H52.4: Noted: 2021-05-25

## 2021-05-25 PROBLEM — H52.222: Noted: 2021-05-25

## 2021-05-25 PROCEDURE — 92015 DETERMINE REFRACTIVE STATE: CPT

## 2021-05-25 PROCEDURE — 92014 COMPRE OPH EXAM EST PT 1/>: CPT

## 2021-05-25 NOTE — PATIENT DISCUSSION
1. Myopia OU with Astigmatism OS: Rx was given for correction if indicated and requested. 2. Presbyopia3. Dry Eyes OU with PEK OU - Recommend ATs TID OU routinely. CTL no change finalized RX and given to patient. Return for an appointment in 1 year 40/cc with Dr. Lillian Miller.

## 2021-10-19 NOTE — PATIENT DISCUSSION
The IOP is borderline. The patient will continue the current management at this time. If IOP borderline at next visit, consider repeating SLT.

## 2022-01-24 NOTE — PATIENT DISCUSSION
IOP still borderline; discussed tx options with pt such as drops vs. repeating SLT. Pt wishes to start with drops first. Start lumigan QHS OU.

## 2022-04-02 ASSESSMENT — VISUAL ACUITY
OU_CC: J1+
OU_CC: J1+
OS_SC: 20/20
OD_PH: SC 20/20
OD_SC: 20/20
OU_SC: 20/20
OD_CC: J1+
OS_SC: 20/30
OD_SC: 20/20
OD_SC: 20/25
OS_CC: J1+
OS_SC: 20/40
OD_SC: 20/30-1
OS_PH: CC 20/20
OS_CC: J1+
OS_PH: CC 20/20
OD_SC: 20/20
OD_SC: 20/20
OD_PH: CC 20/20
OS_PH: SC 20/20
OS_SC: 20/25-1
OD_SC: 20/40
OS_SC: 20/20
OD_SC: 20/50
OS_SC: 20/30
OD_CC: J1+
OS_SC: 20/25
OU_SC: 20/25+2
OU_CC: J1+
OS_SC: 20/20
OD_PH: CC 20/20
OS_SC: 20/25
OU_CC: J1+
OD_SC: 20/40

## 2022-04-02 ASSESSMENT — TONOMETRY
OS_IOP_MMHG: 10
OS_IOP_MMHG: 9
OS_IOP_MMHG: 11
OS_IOP_MMHG: 11
OD_IOP_MMHG: 10
OD_IOP_MMHG: 9
OS_IOP_MMHG: 10
OD_IOP_MMHG: 11
OD_IOP_MMHG: 10
OS_IOP_MMHG: 8
OD_IOP_MMHG: 8
OD_IOP_MMHG: 12

## 2022-05-11 ENCOUNTER — COMPREHENSIVE EXAM (OUTPATIENT)
Dept: URBAN - METROPOLITAN AREA CLINIC 1 | Facility: CLINIC | Age: 47
End: 2022-05-11

## 2022-05-11 DIAGNOSIS — H52.222: ICD-10-CM

## 2022-05-11 DIAGNOSIS — H52.4: ICD-10-CM

## 2022-05-11 DIAGNOSIS — H52.13: ICD-10-CM

## 2022-05-11 PROCEDURE — 92310 CONTACT LENS FITTING OU: CPT

## 2022-05-11 PROCEDURE — 92014 COMPRE OPH EXAM EST PT 1/>: CPT

## 2022-05-11 ASSESSMENT — VISUAL ACUITY
OD_CC: J1+
OS_CC: 20/20
OS_CC: J1+
OD_CC: 20/20

## 2022-05-11 ASSESSMENT — TONOMETRY
OD_IOP_MMHG: 12
OS_IOP_MMHG: 12

## 2022-05-11 NOTE — PATIENT DISCUSSION
Patient given Rx for glasses and contacts to be ordered by [facility] (changed MF power from Low to Med OU).

## 2023-01-19 ENCOUNTER — COMPREHENSIVE EXAM (OUTPATIENT)
Dept: URBAN - METROPOLITAN AREA CLINIC 1 | Facility: CLINIC | Age: 48
End: 2023-01-19

## 2023-01-19 DIAGNOSIS — H04.123: ICD-10-CM

## 2023-01-19 DIAGNOSIS — H16.143: ICD-10-CM

## 2023-01-19 DIAGNOSIS — H52.13: ICD-10-CM

## 2023-01-19 DIAGNOSIS — H52.222: ICD-10-CM

## 2023-01-19 DIAGNOSIS — H52.4: ICD-10-CM

## 2023-01-19 PROCEDURE — 92014 COMPRE OPH EXAM EST PT 1/>: CPT

## 2023-01-19 PROCEDURE — 92015 DETERMINE REFRACTIVE STATE: CPT

## 2023-01-19 PROCEDURE — 92310-12 CONTACT LENS FITTING - 90

## 2023-01-19 ASSESSMENT — VISUAL ACUITY
OS_CC: 20/20
OS_CC: J1
OD_CC: 20/20
OD_CC: J1

## 2023-01-19 ASSESSMENT — TONOMETRY
OD_IOP_MMHG: 10
OS_IOP_MMHG: 14

## 2023-01-19 NOTE — PATIENT DISCUSSION
Patient given Rx for glasses. CTL finalized today (-6.75sph ADD Med OD, -6.25sph ADD Med OS) Air Optix Hydraglyde MF. Trials also given today of finalized CTL, patient advised to call if any problems arise.

## 2023-07-10 NOTE — PATIENT DISCUSSION
Monitor. Melolabial Interpolation Flap Text: A decision was made to reconstruct the defect utilizing an interpolation axial flap and a staged reconstruction.  A telfa template was made of the defect.  This telfa template was then used to outline the melolabial interpolation flap.  The donor area for the pedicle flap was then injected with anesthesia.  The flap was excised through the skin and subcutaneous tissue down to the layer of the underlying musculature.  The pedicle flap was carefully excised within this deep plane to maintain its blood supply.  The edges of the donor site were undermined.   The donor site was closed in a primary fashion.  The pedicle was then rotated into position and sutured.  Once the tube was sutured into place, adequate blood supply was confirmed with blanching and refill.  The pedicle was then wrapped with xeroform gauze and dressed appropriately with a telfa and gauze bandage to ensure continued blood supply and protect the attached pedicle.

## 2024-01-26 ENCOUNTER — COMPREHENSIVE EXAM (OUTPATIENT)
Dept: URBAN - METROPOLITAN AREA CLINIC 1 | Facility: CLINIC | Age: 49
End: 2024-01-26

## 2024-01-26 DIAGNOSIS — H16.143: ICD-10-CM

## 2024-01-26 DIAGNOSIS — H04.123: ICD-10-CM

## 2024-01-26 DIAGNOSIS — H25.813: ICD-10-CM

## 2024-01-26 PROCEDURE — 92014 COMPRE OPH EXAM EST PT 1/>: CPT

## 2024-01-26 ASSESSMENT — TONOMETRY
OD_IOP_MMHG: 14
OS_IOP_MMHG: 14

## 2024-01-26 ASSESSMENT — VISUAL ACUITY
OD_CC: 20/20
OS_CC: 20/25

## 2024-02-16 ENCOUNTER — COMPREHENSIVE EXAM (OUTPATIENT)
Dept: URBAN - METROPOLITAN AREA CLINIC 1 | Facility: CLINIC | Age: 49
End: 2024-02-16

## 2024-02-16 DIAGNOSIS — H52.13: ICD-10-CM

## 2024-02-16 DIAGNOSIS — H52.222: ICD-10-CM

## 2024-02-16 DIAGNOSIS — Z46.0: ICD-10-CM

## 2024-02-16 DIAGNOSIS — H52.4: ICD-10-CM

## 2024-02-16 DIAGNOSIS — Z01.00: ICD-10-CM

## 2024-02-16 PROCEDURE — 92014 COMPRE OPH EXAM EST PT 1/>: CPT

## 2024-02-16 PROCEDURE — 92310-12 CONTACT LENS FITTING - 90

## 2024-02-16 PROCEDURE — 92015 DETERMINE REFRACTIVE STATE: CPT

## 2024-02-16 ASSESSMENT — VISUAL ACUITY
OS_SC: J7
OS_SC: 20/300
OS_CC: 20/25
OD_SC: 20/300
OD_CC: 20/20
OD_SC: J7

## 2024-09-14 NOTE — PATIENT DISCUSSION
Discussed importance of compliance with ocular medications and follow up exams to prevent loss of vision. Is This A New Presentation, Or A Follow-Up?: Skin Lesions

## 2025-02-18 ENCOUNTER — COMPREHENSIVE EXAM (OUTPATIENT)
Age: 50
End: 2025-02-18

## 2025-02-18 DIAGNOSIS — Z01.00: ICD-10-CM

## 2025-02-18 DIAGNOSIS — H52.4: ICD-10-CM

## 2025-02-18 DIAGNOSIS — Z46.0: ICD-10-CM

## 2025-02-18 DIAGNOSIS — H52.13: ICD-10-CM

## 2025-02-18 DIAGNOSIS — H52.222: ICD-10-CM

## 2025-02-18 PROCEDURE — 92015 DETERMINE REFRACTIVE STATE: CPT

## 2025-02-18 PROCEDURE — 92014 COMPRE OPH EXAM EST PT 1/>: CPT

## 2025-02-18 PROCEDURE — 92310-3 LEVEL 3 SOFT LENS UPDATE

## 2025-03-05 ENCOUNTER — FOLLOW UP (OUTPATIENT)
Age: 50
End: 2025-03-05

## 2025-03-05 DIAGNOSIS — Z46.0: ICD-10-CM

## 2025-03-05 PROCEDURE — 92310F
